# Patient Record
Sex: FEMALE | Race: ASIAN | URBAN - METROPOLITAN AREA
[De-identification: names, ages, dates, MRNs, and addresses within clinical notes are randomized per-mention and may not be internally consistent; named-entity substitution may affect disease eponyms.]

---

## 2024-06-20 ENCOUNTER — APPOINTMENT (RX ONLY)
Dept: URBAN - METROPOLITAN AREA CLINIC 93 | Facility: CLINIC | Age: 34
Setting detail: DERMATOLOGY
End: 2024-06-20

## 2024-06-20 DIAGNOSIS — K13.0 DISEASES OF LIPS: ICD-10-CM | Status: INADEQUATELY CONTROLLED

## 2024-06-20 PROCEDURE — ? COUNSELING

## 2024-06-20 PROCEDURE — ? ORDER TESTS

## 2024-06-20 PROCEDURE — ? PRESCRIPTION MEDICATION MANAGEMENT

## 2024-06-20 PROCEDURE — ? PRESCRIPTION

## 2024-06-20 PROCEDURE — ? ADDITIONAL NOTES

## 2024-06-20 PROCEDURE — 99204 OFFICE O/P NEW MOD 45 MIN: CPT

## 2024-06-20 RX ORDER — DEXAMETHASONE 0.75 MG/1
TABLET ORAL
Qty: 24 | Refills: 0 | Status: ERX | COMMUNITY
Start: 2024-06-20

## 2024-06-20 RX ORDER — HYDROCORTISONE 25 MG/G
OINTMENT TOPICAL
Qty: 28.35 | Refills: 4 | Status: ERX | COMMUNITY
Start: 2024-06-20

## 2024-06-20 RX ADMIN — DEXAMETHASONE: 0.75 TABLET ORAL at 00:00

## 2024-06-20 RX ADMIN — HYDROCORTISONE: 25 OINTMENT TOPICAL at 00:00

## 2024-06-20 ASSESSMENT — LOCATION ZONE DERM: LOCATION ZONE: LIP

## 2024-06-20 ASSESSMENT — LOCATION SIMPLE DESCRIPTION DERM
LOCATION SIMPLE: RIGHT LIP
LOCATION SIMPLE: LEFT LIP

## 2024-06-20 ASSESSMENT — LOCATION DETAILED DESCRIPTION DERM
LOCATION DETAILED: LEFT INFERIOR VERMILION LIP
LOCATION DETAILED: LEFT SUPERIOR VERMILION LIP
LOCATION DETAILED: RIGHT INFERIOR VERMILION LIP
LOCATION DETAILED: RIGHT SUPERIOR VERMILION LIP

## 2024-06-20 NOTE — PROCEDURE: ORDER TESTS
Billing Type: Third-Party Bill
Expected Date Of Service: 06/20/2024
Performing Laboratory: [id number]
Bill For Surgical Tray: no

## 2024-06-20 NOTE — PROCEDURE: ADDITIONAL NOTES
Additional Notes: 06/20/2024 We discussed that in some cases B12 or iron deficiency can cause cheilitis. Patient stated that also gets frequent canker sores and cold sores. Patient is not up to date with her regular BW. I advise we will be ordering it to rule out the deficiency. Patient stated understanding.
Detail Level: Simple
Render Risk Assessment In Note?: yes
Additional Notes: 06/20/2024 Patient to avoid any acidic fruits or vegetables.

## 2024-06-20 NOTE — PROCEDURE: PRESCRIPTION MEDICATION MANAGEMENT
Plan: BW order will be provided. We will contact the patient once we get the results.\\nFollow up in 4 weeks or sooner if needed.
Render In Strict Bullet Format?: No
Detail Level: Simple
Initiate Treatment: hydrocortisone 2.5 % topical ointment TP Sig: Apply to the lips twice a day for 2 weeks, then hold for 2 weeks; repeat cycle as needed.\\n\\ndexamethasone 0.75 mg tablet PO Sig: Take 3 tablets by mouth for 4 days, then 2 tablet for 4 days and 1 tablet for 4 days.

## 2024-07-11 ENCOUNTER — APPOINTMENT (RX ONLY)
Dept: URBAN - METROPOLITAN AREA CLINIC 93 | Facility: CLINIC | Age: 34
Setting detail: DERMATOLOGY
End: 2024-07-11

## 2024-07-11 DIAGNOSIS — K13.0 DISEASES OF LIPS: ICD-10-CM | Status: IMPROVED

## 2024-07-11 PROCEDURE — ? LAB REPORTS REVIEWED

## 2024-07-11 PROCEDURE — ? ADDITIONAL NOTES

## 2024-07-11 PROCEDURE — ? PRESCRIPTION MEDICATION MANAGEMENT

## 2024-07-11 PROCEDURE — 99214 OFFICE O/P EST MOD 30 MIN: CPT

## 2024-07-11 PROCEDURE — ? COUNSELING

## 2024-07-11 ASSESSMENT — LOCATION DETAILED DESCRIPTION DERM
LOCATION DETAILED: RIGHT INFERIOR VERMILION LIP
LOCATION DETAILED: LEFT INFERIOR VERMILION LIP
LOCATION DETAILED: RIGHT SUPERIOR VERMILION LIP
LOCATION DETAILED: LEFT SUPERIOR VERMILION LIP

## 2024-07-11 ASSESSMENT — LOCATION SIMPLE DESCRIPTION DERM
LOCATION SIMPLE: LEFT LIP
LOCATION SIMPLE: RIGHT LIP

## 2024-07-11 ASSESSMENT — LOCATION ZONE DERM: LOCATION ZONE: LIP

## 2024-07-11 NOTE — PROCEDURE: PRESCRIPTION MEDICATION MANAGEMENT
Plan: Follow up in 6 months or sooner if needed.
Continue Regimen: hydrocortisone 2.5 % topical ointment TP Sig: Apply to the lips twice a day for 2 weeks, then hold for 2 weeks; repeat cycle as needed.
Render In Strict Bullet Format?: No
Discontinue Regimen: dexamethasone 0.75 mg tablet
Detail Level: Simple

## 2024-07-11 NOTE — PROCEDURE: ADDITIONAL NOTES
Additional Notes: 07/11/2024 Patient to avoid any acidic fruits or vegetables.
Detail Level: Simple
Render Risk Assessment In Note?: yes

## 2024-07-11 NOTE — PROCEDURE: LAB REPORTS REVIEWED
Detail Level: Zone
Summarized Lab Results: The results of the BW discussed in a great detail. Normal iron and B12. However, patient's AST and ALT elevated - strongly advise to follow up with PCP for evaluation. Patient stated understanding.